# Patient Record
Sex: FEMALE | Race: WHITE | NOT HISPANIC OR LATINO | Employment: FULL TIME | URBAN - METROPOLITAN AREA
[De-identification: names, ages, dates, MRNs, and addresses within clinical notes are randomized per-mention and may not be internally consistent; named-entity substitution may affect disease eponyms.]

---
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
## 2020-06-21 NOTE — PROGRESS NOTES
"Subjective:      Tri Cannon is a 17 y.o. female who presents with Back Pain (right sided lower back pain X 1.5 wks )            Patient comes in today with a 10 day history of left sided low back pain.  Denies any trauma.  No radicular pain, numbness, tingling, or weakness.  NO saddle anesthesia or loss of control of bowel or bladder.  It occurs intermittently, worse with prolonged standing.  She does have 2 jobs and is on her feet a lot.  No diarrhea, constipation, blood/mucus/or tarry stools.  No nausea or vomiting.  She is worried she may have a UTI as she had one remotely and felt a similar back pain at that time.  She denies any urinary urgency, frequency, or dysuria.  No fever or chills.  Currently on menstrual cycle.  Denies any known ovarian cysts, endometriosis, or other gynecological dysfunction.          Review of Systems   Constitutional: Negative for chills, diaphoresis, fever and malaise/fatigue.   Genitourinary: Negative for dysuria, flank pain, frequency, hematuria and urgency.   Musculoskeletal: Positive for back pain. Negative for falls.   Neurological: Negative for weakness.     Medications, Allergies, and current problem list reviewed today in Epic     Objective:     /68   Pulse 84   Temp 37.3 °C (99.1 °F)   Resp 16   Ht 1.626 m (5' 4\")   Wt 57.3 kg (126 lb 6.4 oz)   LMP 12/30/2017   SpO2 95%   Breastfeeding? No   BMI 21.70 kg/m²      Physical Exam   Constitutional: She is oriented to person, place, and time. She appears well-developed and well-nourished. No distress.   Cardiovascular: Normal rate, regular rhythm and normal heart sounds.  Exam reveals no gallop and no friction rub.    No murmur heard.  Pulmonary/Chest: Effort normal and breath sounds normal.   Abdominal: Soft. Normal appearance and bowel sounds are normal. She exhibits no shifting dullness, no distension, no pulsatile liver, no fluid wave, no abdominal bruit, no ascites, no pulsatile midline mass and no " mass. There is no hepatosplenomegaly. There is no tenderness. There is no rigidity, no rebound, no guarding, no CVA tenderness, no tenderness at McBurney's point and negative White's sign.   Musculoskeletal: She exhibits no edema.        Lumbar back: She exhibits tenderness. She exhibits normal range of motion, no bony tenderness, no swelling, no edema, no deformity, no laceration, no pain, no spasm and normal pulse.        Back:    Back is warm, dry, and intact with no bruising, swelling, erythema, rash or lesion.  No bony tenderness.  Focal left sided lumbar TTP without spasm.  ROM intact.  Leg strength and sensation intact.  No gait abnormalities.     Neurological: She is alert and oriented to person, place, and time.   Skin: Skin is warm and dry. No rash noted. She is not diaphoretic. No erythema.   Vitals reviewed.    POCT urine pregnancy: negative  View SYLOB     POCT URINALYSIS (Order #[id number]) on 1/2/18   Component Results     Component Value Ref Range & Units Status   POC Color yellow Negative Final   POC Appearance clear Negative Final   POC Leukocyte Esterase neg Negative Final   POC Nitrites neg Negative Final   POC Urobiligen neg Negative (0.2) mg/dL Final   POC Protein neg Negative mg/dL Final   POC Urine PH 7.5 5.0 - 8.0 Final   POC Blood tr Negative Final   POC Specific Gravity 1.020 <1.005 - >1.030 Final   POC Ketones neg Negative mg/dL Final   POC Biliruben neg Negative mg/dL Final   POC Glucose neg Negative mg/dL Final   Last Resulted Time   Tue Jan 2, 2018  5:21 PM               Assessment/Plan:     1. Acute left-sided low back pain without sciatica  - POCT Urinalysis  - POCT Pregnancy  - naproxen (NAPROSYN) 500 MG Tab; Take 1 Tab by mouth 2 times a day, with meals for 5 days.  Dispense: 10 Tab; Refill: 0  - URINE CULTURE(NEW); Future    Discussed exam findings with patient.  Findings consistent with muscle strain.  Trial naprosyn, gentle stretching, massage, and postural awareness  for 1 week.  Will order urine culture to rule out UTI.  Follow up in 1-2 weeks if symptoms persist, sooner if worse.  Patient verbalized understanding of and agreed with plan of care.       +edema of upper and lower lips